# Patient Record
Sex: FEMALE | Race: WHITE | NOT HISPANIC OR LATINO | Employment: PART TIME | URBAN - NONMETROPOLITAN AREA
[De-identification: names, ages, dates, MRNs, and addresses within clinical notes are randomized per-mention and may not be internally consistent; named-entity substitution may affect disease eponyms.]

---

## 2017-04-07 ENCOUNTER — APPOINTMENT (OUTPATIENT)
Dept: LAB | Facility: HOSPITAL | Age: 22
End: 2017-04-07

## 2017-04-07 ENCOUNTER — TRANSCRIBE ORDERS (OUTPATIENT)
Dept: ADMINISTRATIVE | Facility: HOSPITAL | Age: 22
End: 2017-04-07

## 2017-04-07 DIAGNOSIS — R60.9 EDEMA, UNSPECIFIED TYPE: Primary | ICD-10-CM

## 2017-04-07 LAB
ALBUMIN SERPL-MCNC: 4.4 G/DL (ref 3.5–5)
ALBUMIN/GLOB SERPL: 1.2 G/DL (ref 1–2)
ALP SERPL-CCNC: 95 U/L (ref 38–126)
ALT SERPL W P-5'-P-CCNC: 35 U/L (ref 13–69)
ANION GAP SERPL CALCULATED.3IONS-SCNC: 11.8 MMOL/L
AST SERPL-CCNC: 21 U/L (ref 15–46)
BACTERIA UR QL AUTO: ABNORMAL /HPF
BASOPHILS # BLD AUTO: 0.03 10*3/MM3 (ref 0–0.2)
BASOPHILS NFR BLD AUTO: 0.4 % (ref 0–2.5)
BILIRUB SERPL-MCNC: 0.6 MG/DL (ref 0.2–1.3)
BILIRUB UR QL STRIP: NEGATIVE
BUN BLD-MCNC: 9 MG/DL (ref 7–20)
BUN/CREAT SERPL: 12.9 (ref 7.1–23.5)
CALCIUM SPEC-SCNC: 9 MG/DL (ref 8.4–10.2)
CHLORIDE SERPL-SCNC: 105 MMOL/L (ref 98–107)
CHOLEST SERPL-MCNC: 183 MG/DL (ref 0–199)
CLARITY UR: CLEAR
CO2 SERPL-SCNC: 27 MMOL/L (ref 26–30)
COLOR UR: YELLOW
CREAT BLD-MCNC: 0.7 MG/DL (ref 0.6–1.3)
DEPRECATED RDW RBC AUTO: 36.8 FL (ref 37–54)
EOSINOPHIL # BLD AUTO: 0.07 10*3/MM3 (ref 0–0.7)
EOSINOPHIL NFR BLD AUTO: 1 % (ref 0–7)
ERYTHROCYTE [DISTWIDTH] IN BLOOD BY AUTOMATED COUNT: 12.4 % (ref 11.5–14.5)
GFR SERPL CREATININE-BSD FRML MDRD: 105 ML/MIN/1.73
GLOBULIN UR ELPH-MCNC: 3.6 GM/DL
GLUCOSE BLD-MCNC: 84 MG/DL (ref 74–98)
GLUCOSE UR STRIP-MCNC: NEGATIVE MG/DL
HCT VFR BLD AUTO: 40.4 % (ref 37–47)
HDLC SERPL-MCNC: 46 MG/DL (ref 40–60)
HGB BLD-MCNC: 13.8 G/DL (ref 12–16)
HGB UR QL STRIP.AUTO: NEGATIVE
HYALINE CASTS UR QL AUTO: ABNORMAL /LPF
IMM GRANULOCYTES # BLD: 0.02 10*3/MM3 (ref 0–0.06)
IMM GRANULOCYTES NFR BLD: 0.3 % (ref 0–0.6)
KETONES UR QL STRIP: NEGATIVE
LDLC SERPL CALC-MCNC: 118 MG/DL (ref 0–99)
LDLC/HDLC SERPL: 2.56 {RATIO}
LEUKOCYTE ESTERASE UR QL STRIP.AUTO: ABNORMAL
LYMPHOCYTES # BLD AUTO: 2.13 10*3/MM3 (ref 0.6–3.4)
LYMPHOCYTES NFR BLD AUTO: 30.3 % (ref 10–50)
MCH RBC QN AUTO: 28.2 PG (ref 27–31)
MCHC RBC AUTO-ENTMCNC: 34.2 G/DL (ref 30–37)
MCV RBC AUTO: 82.4 FL (ref 81–99)
MONOCYTES # BLD AUTO: 0.51 10*3/MM3 (ref 0–0.9)
MONOCYTES NFR BLD AUTO: 7.2 % (ref 0–12)
NEUTROPHILS # BLD AUTO: 4.28 10*3/MM3 (ref 2–6.9)
NEUTROPHILS NFR BLD AUTO: 60.8 % (ref 37–80)
NITRITE UR QL STRIP: NEGATIVE
NRBC BLD MANUAL-RTO: 0 /100 WBC (ref 0–0)
NT-PROBNP SERPL-MCNC: 56.9 PG/ML (ref 0–125)
PH UR STRIP.AUTO: 7.5 [PH] (ref 5–8)
PLATELET # BLD AUTO: 241 10*3/MM3 (ref 130–400)
PMV BLD AUTO: 10.4 FL (ref 6–12)
POTASSIUM BLD-SCNC: 3.8 MMOL/L (ref 3.5–5.1)
PROT SERPL-MCNC: 8 G/DL (ref 6.3–8.2)
PROT UR QL STRIP: NEGATIVE
RBC # BLD AUTO: 4.9 10*6/MM3 (ref 4.2–5.4)
RBC # UR: ABNORMAL /HPF
REF LAB TEST METHOD: ABNORMAL
SODIUM BLD-SCNC: 140 MMOL/L (ref 137–145)
SP GR UR STRIP: 1.02 (ref 1–1.03)
SQUAMOUS #/AREA URNS HPF: ABNORMAL /HPF
TRIGL SERPL-MCNC: 97 MG/DL
TSH SERPL DL<=0.05 MIU/L-ACNC: 1.75 MIU/ML (ref 0.47–4.68)
UROBILINOGEN UR QL STRIP: ABNORMAL
VLDLC SERPL-MCNC: 19.4 MG/DL
WBC NRBC COR # BLD: 7.04 10*3/MM3 (ref 4.8–10.8)
WBC UR QL AUTO: ABNORMAL /HPF

## 2017-04-07 PROCEDURE — 85025 COMPLETE CBC W/AUTO DIFF WBC: CPT | Performed by: FAMILY MEDICINE

## 2017-04-07 PROCEDURE — 84443 ASSAY THYROID STIM HORMONE: CPT | Performed by: FAMILY MEDICINE

## 2017-04-07 PROCEDURE — 80061 LIPID PANEL: CPT | Performed by: FAMILY MEDICINE

## 2017-04-07 PROCEDURE — 82570 ASSAY OF URINE CREATININE: CPT | Performed by: FAMILY MEDICINE

## 2017-04-07 PROCEDURE — 81001 URINALYSIS AUTO W/SCOPE: CPT | Performed by: FAMILY MEDICINE

## 2017-04-07 PROCEDURE — 82043 UR ALBUMIN QUANTITATIVE: CPT | Performed by: FAMILY MEDICINE

## 2017-04-07 PROCEDURE — 83880 ASSAY OF NATRIURETIC PEPTIDE: CPT | Performed by: FAMILY MEDICINE

## 2017-04-07 PROCEDURE — 36415 COLL VENOUS BLD VENIPUNCTURE: CPT | Performed by: FAMILY MEDICINE

## 2017-04-07 PROCEDURE — 80053 COMPREHEN METABOLIC PANEL: CPT | Performed by: FAMILY MEDICINE

## 2017-04-08 LAB
CREAT 24H UR-MCNC: 80 MG/DL
MICROALB/CRT. RATIO UR: 14.9 MG/G CREAT (ref 0–30)
MICROALBUMIN UR-MCNC: 11.9 UG/ML

## 2017-04-11 ENCOUNTER — HOSPITAL ENCOUNTER (EMERGENCY)
Facility: HOSPITAL | Age: 22
Discharge: HOME | End: 2017-04-12
Attending: EMERGENCY MEDICINE | Admitting: EMERGENCY MEDICINE

## 2017-04-11 DIAGNOSIS — I95.1 SYMPATHOTONIC ORTHOSTATIC HYPOTENSION: Primary | ICD-10-CM

## 2017-04-11 LAB
ALBUMIN SERPL-MCNC: 4.6 G/DL (ref 3.5–5)
ALBUMIN/GLOB SERPL: 1.2 G/DL (ref 1–2)
ALP SERPL-CCNC: 85 U/L (ref 38–126)
ALT SERPL W P-5'-P-CCNC: 36 U/L (ref 13–69)
ANION GAP SERPL CALCULATED.3IONS-SCNC: 18.7 MMOL/L
AST SERPL-CCNC: 58 U/L (ref 15–46)
BASOPHILS # BLD AUTO: 0.04 10*3/MM3 (ref 0–0.2)
BASOPHILS NFR BLD AUTO: 0.5 % (ref 0–2.5)
BILIRUB SERPL-MCNC: 0.8 MG/DL (ref 0.2–1.3)
BUN BLD-MCNC: 16 MG/DL (ref 7–20)
BUN/CREAT SERPL: 12.3 (ref 7.1–23.5)
CALCIUM SPEC-SCNC: 9.4 MG/DL (ref 8.4–10.2)
CHLORIDE SERPL-SCNC: 101 MMOL/L (ref 98–107)
CO2 SERPL-SCNC: 27 MMOL/L (ref 26–30)
CREAT BLD-MCNC: 1.3 MG/DL (ref 0.6–1.3)
DEPRECATED RDW RBC AUTO: 37.3 FL (ref 37–54)
EOSINOPHIL # BLD AUTO: 0.1 10*3/MM3 (ref 0–0.7)
EOSINOPHIL NFR BLD AUTO: 1.2 % (ref 0–7)
ERYTHROCYTE [DISTWIDTH] IN BLOOD BY AUTOMATED COUNT: 12.6 % (ref 11.5–14.5)
GFR SERPL CREATININE-BSD FRML MDRD: 51 ML/MIN/1.73
GLOBULIN UR ELPH-MCNC: 4 GM/DL
GLUCOSE BLD-MCNC: 126 MG/DL (ref 74–98)
HCT VFR BLD AUTO: 42.4 % (ref 37–47)
HGB BLD-MCNC: 14.5 G/DL (ref 12–16)
IMM GRANULOCYTES # BLD: 0.03 10*3/MM3 (ref 0–0.06)
IMM GRANULOCYTES NFR BLD: 0.3 % (ref 0–0.6)
LYMPHOCYTES # BLD AUTO: 2.77 10*3/MM3 (ref 0.6–3.4)
LYMPHOCYTES NFR BLD AUTO: 32.1 % (ref 10–50)
MCH RBC QN AUTO: 28 PG (ref 27–31)
MCHC RBC AUTO-ENTMCNC: 34.2 G/DL (ref 30–37)
MCV RBC AUTO: 82 FL (ref 81–99)
MONOCYTES # BLD AUTO: 0.56 10*3/MM3 (ref 0–0.9)
MONOCYTES NFR BLD AUTO: 6.5 % (ref 0–12)
NEUTROPHILS # BLD AUTO: 5.13 10*3/MM3 (ref 2–6.9)
NEUTROPHILS NFR BLD AUTO: 59.4 % (ref 37–80)
NRBC BLD MANUAL-RTO: 0 /100 WBC (ref 0–0)
PLATELET # BLD AUTO: 291 10*3/MM3 (ref 130–400)
PMV BLD AUTO: 9.9 FL (ref 6–12)
POTASSIUM BLD-SCNC: 3.7 MMOL/L (ref 3.5–5.1)
PROT SERPL-MCNC: 8.6 G/DL (ref 6.3–8.2)
RBC # BLD AUTO: 5.17 10*6/MM3 (ref 4.2–5.4)
SODIUM BLD-SCNC: 143 MMOL/L (ref 137–145)
WBC NRBC COR # BLD: 8.63 10*3/MM3 (ref 4.8–10.8)

## 2017-04-11 PROCEDURE — 85025 COMPLETE CBC W/AUTO DIFF WBC: CPT | Performed by: EMERGENCY MEDICINE

## 2017-04-11 PROCEDURE — 84703 CHORIONIC GONADOTROPIN ASSAY: CPT | Performed by: EMERGENCY MEDICINE

## 2017-04-11 PROCEDURE — 93005 ELECTROCARDIOGRAM TRACING: CPT | Performed by: EMERGENCY MEDICINE

## 2017-04-11 PROCEDURE — 80053 COMPREHEN METABOLIC PANEL: CPT | Performed by: EMERGENCY MEDICINE

## 2017-04-11 PROCEDURE — 99283 EMERGENCY DEPT VISIT LOW MDM: CPT

## 2017-04-11 PROCEDURE — 96360 HYDRATION IV INFUSION INIT: CPT

## 2017-04-11 RX ORDER — SODIUM CHLORIDE 0.9 % (FLUSH) 0.9 %
10 SYRINGE (ML) INJECTION AS NEEDED
Status: DISCONTINUED | OUTPATIENT
Start: 2017-04-11 | End: 2017-04-12 | Stop reason: HOSPADM

## 2017-04-11 RX ORDER — TRIAMTERENE AND HYDROCHLOROTHIAZIDE 37.5; 25 MG/1; MG/1
1 TABLET ORAL DAILY
COMMUNITY

## 2017-04-11 RX ORDER — LISINOPRIL 10 MG/1
10 TABLET ORAL DAILY
COMMUNITY
End: 2018-08-31

## 2017-04-11 RX ADMIN — SODIUM CHLORIDE 500 ML: 9 INJECTION, SOLUTION INTRAVENOUS at 23:51

## 2017-04-12 VITALS
SYSTOLIC BLOOD PRESSURE: 123 MMHG | HEART RATE: 70 BPM | WEIGHT: 291 LBS | RESPIRATION RATE: 18 BRPM | DIASTOLIC BLOOD PRESSURE: 71 MMHG | HEIGHT: 64 IN | OXYGEN SATURATION: 98 % | TEMPERATURE: 97.6 F | BODY MASS INDEX: 49.68 KG/M2

## 2017-04-12 LAB
HCG SERPL QL: NEGATIVE
HOLD SPECIMEN: NORMAL
HOLD SPECIMEN: NORMAL
WHOLE BLOOD HOLD SPECIMEN: NORMAL
WHOLE BLOOD HOLD SPECIMEN: NORMAL

## 2017-04-12 NOTE — ED PROVIDER NOTES
Subjective   History of Present Illness   21F w/ hx of anxiety, htn, asthma p/w orthostatic lightheadedness, sob and palpitations after being started on hydrochlorothiazide, triamterene, and lisinopril earlier today.  States she had some elevated blood pressure in clinic and so they started her on these 3 medications.  It was her first dose of both the lisinopril and the triamterene today.  She denies any other acute changes.  States that her symptoms are worse when she sits up or stands up, and she feels slightly unsteady on her feet.  No other complaints.    Review of Systems   Respiratory: Positive for shortness of breath.    Neurological: Positive for light-headedness.   All other systems reviewed and are negative.      Past Medical History:   Diagnosis Date   • Anxiety    • Asthma    • Hypertension    • Sleep apnea        No Known Allergies    Past Surgical History:   Procedure Laterality Date   • ADENOIDECTOMY     • KIDNEY STONE SURGERY     • TONSILLECTOMY         History reviewed. No pertinent family history.    Social History     Social History   • Marital status:      Spouse name: N/A   • Number of children: N/A   • Years of education: N/A     Social History Main Topics   • Smoking status: Never Smoker   • Smokeless tobacco: None   • Alcohol use None      Comment: social   • Drug use: None   • Sexual activity: Not Asked     Other Topics Concern   • None     Social History Narrative   • None           Objective   Physical Exam   Constitutional: She is oriented to person, place, and time. She appears well-developed and well-nourished. No distress.   obese   HENT:   Head: Normocephalic.   Mouth/Throat: Oropharynx is clear and moist. No oropharyngeal exudate.   Eyes: Conjunctivae are normal. No scleral icterus.   Neck: Neck supple. No tracheal deviation present.   Cardiovascular: Normal rate, regular rhythm, normal heart sounds and intact distal pulses.  Exam reveals no gallop and no friction rub.    No  murmur heard.  Pulmonary/Chest: Effort normal and breath sounds normal. No stridor. No respiratory distress. She has no wheezes. She has no rales. She exhibits no tenderness.   Abdominal: Soft. She exhibits no distension and no mass. There is no tenderness. There is no rebound and no guarding. No hernia.   Musculoskeletal: She exhibits no edema or deformity.   Neurological: She is alert and oriented to person, place, and time.   Strength and sensation intact to BUE / BLE   Skin: Skin is warm and dry. She is not diaphoretic. No erythema. No pallor.   Psychiatric: She has a normal mood and affect. Her behavior is normal.   Nursing note and vitals reviewed.      Procedures         ED Course  ED Course                  MDM   21F here w/ palpitations, lightheadedness after starting antihypertensives tonight.  Overall, vital signs stable.  Lungs are clear.  Neurovascularly intact.  Patient is symptomatic when she sits up.  Suspect she is and used to having a normalized blood pressure and likely slightly orthostatic from all medications.  We'll give a small fluids, check CBC, BMP, EKG, chest x-ray and anticipate discharge home.    EKG: NSR w/ nl intervals, no yimi/std.     12:31 AM Labs overall unremarkable. Pt feeling improved w/ PO and IV fluids. Discussed holding lisinopril and following up w/ pcp tomorrow. Discussed strict return to care precautions.     Final diagnoses:   Sympathotonic orthostatic hypotension            Raoul Mills MD  04/12/17 0032

## 2017-04-12 NOTE — DISCHARGE INSTRUCTIONS
Please call and follow up with your primary care physician in the next 1-2 days for re-evaluation.   Please stop taking your lisinopril until you follow up with your primary care physician.

## 2018-08-31 ENCOUNTER — APPOINTMENT (OUTPATIENT)
Dept: CT IMAGING | Facility: HOSPITAL | Age: 23
End: 2018-08-31

## 2018-08-31 ENCOUNTER — HOSPITAL ENCOUNTER (EMERGENCY)
Facility: HOSPITAL | Age: 23
Discharge: HOME | End: 2018-08-31
Attending: EMERGENCY MEDICINE | Admitting: EMERGENCY MEDICINE

## 2018-08-31 VITALS
SYSTOLIC BLOOD PRESSURE: 140 MMHG | RESPIRATION RATE: 18 BRPM | BODY MASS INDEX: 48.65 KG/M2 | WEIGHT: 285 LBS | DIASTOLIC BLOOD PRESSURE: 86 MMHG | HEART RATE: 77 BPM | HEIGHT: 64 IN | TEMPERATURE: 97.7 F | OXYGEN SATURATION: 98 %

## 2018-08-31 DIAGNOSIS — R06.00 ACUTE DYSPNEA: Primary | ICD-10-CM

## 2018-08-31 DIAGNOSIS — R07.9 NONSPECIFIC CHEST PAIN: ICD-10-CM

## 2018-08-31 LAB
ANION GAP SERPL CALCULATED.3IONS-SCNC: 5 MMOL/L (ref 3–11)
BASOPHILS # BLD AUTO: 0.03 10*3/MM3 (ref 0–0.2)
BASOPHILS NFR BLD AUTO: 0.3 % (ref 0–1)
BNP SERPL-MCNC: 5 PG/ML (ref 0–100)
BUN BLD-MCNC: 16 MG/DL (ref 9–23)
BUN/CREAT SERPL: 21.3 (ref 7–25)
CALCIUM SPEC-SCNC: 9.2 MG/DL (ref 8.7–10.4)
CHLORIDE SERPL-SCNC: 102 MMOL/L (ref 99–109)
CO2 SERPL-SCNC: 29 MMOL/L (ref 20–31)
CREAT BLD-MCNC: 0.75 MG/DL (ref 0.6–1.3)
DEPRECATED RDW RBC AUTO: 36.4 FL (ref 37–54)
EOSINOPHIL # BLD AUTO: 0.05 10*3/MM3 (ref 0–0.3)
EOSINOPHIL NFR BLD AUTO: 0.6 % (ref 0–3)
ERYTHROCYTE [DISTWIDTH] IN BLOOD BY AUTOMATED COUNT: 11.7 % (ref 11.3–14.5)
GFR SERPL CREATININE-BSD FRML MDRD: 96 ML/MIN/1.73
GLUCOSE BLD-MCNC: 79 MG/DL (ref 70–100)
HCT VFR BLD AUTO: 39.1 % (ref 34.5–44)
HGB BLD-MCNC: 13.9 G/DL (ref 11.5–15.5)
IMM GRANULOCYTES # BLD: 0.02 10*3/MM3 (ref 0–0.03)
IMM GRANULOCYTES NFR BLD: 0.2 % (ref 0–0.6)
LYMPHOCYTES # BLD AUTO: 3.14 10*3/MM3 (ref 0.6–4.8)
LYMPHOCYTES NFR BLD AUTO: 34.6 % (ref 24–44)
MCH RBC QN AUTO: 30.7 PG (ref 27–31)
MCHC RBC AUTO-ENTMCNC: 35.5 G/DL (ref 32–36)
MCV RBC AUTO: 86.3 FL (ref 80–99)
MONOCYTES # BLD AUTO: 0.62 10*3/MM3 (ref 0–1)
MONOCYTES NFR BLD AUTO: 6.8 % (ref 0–12)
NEUTROPHILS # BLD AUTO: 5.23 10*3/MM3 (ref 1.5–8.3)
NEUTROPHILS NFR BLD AUTO: 57.7 % (ref 41–71)
PLATELET # BLD AUTO: 216 10*3/MM3 (ref 150–450)
PMV BLD AUTO: 9.8 FL (ref 6–12)
POTASSIUM BLD-SCNC: 3.2 MMOL/L (ref 3.5–5.5)
RBC # BLD AUTO: 4.53 10*6/MM3 (ref 3.89–5.14)
SODIUM BLD-SCNC: 136 MMOL/L (ref 132–146)
WBC NRBC COR # BLD: 9.07 10*3/MM3 (ref 3.5–10.8)

## 2018-08-31 PROCEDURE — 99284 EMERGENCY DEPT VISIT MOD MDM: CPT

## 2018-08-31 PROCEDURE — 25010000002 LORAZEPAM PER 2 MG: Performed by: EMERGENCY MEDICINE

## 2018-08-31 PROCEDURE — 71275 CT ANGIOGRAPHY CHEST: CPT

## 2018-08-31 PROCEDURE — 0 IOPAMIDOL PER 1 ML: Performed by: EMERGENCY MEDICINE

## 2018-08-31 PROCEDURE — 83880 ASSAY OF NATRIURETIC PEPTIDE: CPT | Performed by: EMERGENCY MEDICINE

## 2018-08-31 PROCEDURE — 93005 ELECTROCARDIOGRAM TRACING: CPT | Performed by: EMERGENCY MEDICINE

## 2018-08-31 PROCEDURE — 80048 BASIC METABOLIC PNL TOTAL CA: CPT | Performed by: EMERGENCY MEDICINE

## 2018-08-31 PROCEDURE — 96374 THER/PROPH/DIAG INJ IV PUSH: CPT

## 2018-08-31 PROCEDURE — 85025 COMPLETE CBC W/AUTO DIFF WBC: CPT | Performed by: EMERGENCY MEDICINE

## 2018-08-31 RX ORDER — LORAZEPAM 2 MG/ML
0.5 INJECTION INTRAMUSCULAR ONCE
Status: COMPLETED | OUTPATIENT
Start: 2018-08-31 | End: 2018-08-31

## 2018-08-31 RX ADMIN — LORAZEPAM 0.5 MG: 2 INJECTION INTRAMUSCULAR; INTRAVENOUS at 20:41

## 2018-08-31 RX ADMIN — IOPAMIDOL 64 ML: 755 INJECTION, SOLUTION INTRAVENOUS at 20:01

## 2018-09-01 NOTE — DISCHARGE INSTRUCTIONS
Avoid all stimulants to include caffeine and decongestants.    Push fluids.    Fix your CPAP machine as soon as possible.    Return to the emergency Department immediately if worse.

## 2018-10-19 ENCOUNTER — HOSPITAL ENCOUNTER (EMERGENCY)
Facility: HOSPITAL | Age: 23
Discharge: HOME | End: 2018-10-19
Attending: EMERGENCY MEDICINE | Admitting: EMERGENCY MEDICINE

## 2018-10-19 ENCOUNTER — APPOINTMENT (OUTPATIENT)
Dept: GENERAL RADIOLOGY | Facility: HOSPITAL | Age: 23
End: 2018-10-19

## 2018-10-19 VITALS
OXYGEN SATURATION: 92 % | RESPIRATION RATE: 18 BRPM | TEMPERATURE: 98.2 F | HEIGHT: 64 IN | DIASTOLIC BLOOD PRESSURE: 83 MMHG | BODY MASS INDEX: 47.8 KG/M2 | WEIGHT: 280 LBS | SYSTOLIC BLOOD PRESSURE: 126 MMHG | HEART RATE: 69 BPM

## 2018-10-19 DIAGNOSIS — S46.919A STRAIN OF SHOULDER, UNSPECIFIED LATERALITY, INITIAL ENCOUNTER: ICD-10-CM

## 2018-10-19 DIAGNOSIS — M62.830 BACK SPASM: ICD-10-CM
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
## 2018-10-19 DIAGNOSIS — S16.1XXA STRAIN OF NECK MUSCLE, INITIAL ENCOUNTER: ICD-10-CM

## 2018-10-19 LAB
B-HCG UR QL: NEGATIVE
INTERNAL NEGATIVE CONTROL: NEGATIVE
INTERNAL POSITIVE CONTROL: POSITIVE
Lab: NORMAL

## 2018-10-19 PROCEDURE — 81025 URINE PREGNANCY TEST: CPT | Performed by: EMERGENCY MEDICINE

## 2018-10-19 PROCEDURE — 99283 EMERGENCY DEPT VISIT LOW MDM: CPT

## 2018-10-19 PROCEDURE — 72040 X-RAY EXAM NECK SPINE 2-3 VW: CPT

## 2018-10-19 PROCEDURE — 73030 X-RAY EXAM OF SHOULDER: CPT

## 2018-10-19 RX ORDER — CYCLOBENZAPRINE HCL 10 MG
10 TABLET ORAL ONCE
Status: COMPLETED | OUTPATIENT
Start: 2018-10-19 | End: 2018-10-19

## 2018-10-19 RX ORDER — PREDNISONE 20 MG/1
40 TABLET ORAL DAILY
Qty: 8 TABLET | Refills: 0 | Status: SHIPPED | OUTPATIENT
Start: 2018-10-19

## 2018-10-19 RX ORDER — HYDROCODONE BITARTRATE AND ACETAMINOPHEN 10; 325 MG/1; MG/1
1 TABLET ORAL ONCE
Status: COMPLETED | OUTPATIENT
Start: 2018-10-19 | End: 2018-10-19

## 2018-10-19 RX ORDER — CYCLOBENZAPRINE HCL 10 MG
10 TABLET ORAL 3 TIMES DAILY PRN
Qty: 15 TABLET | Refills: 0 | Status: SHIPPED | OUTPATIENT
Start: 2018-10-19

## 2018-10-19 RX ADMIN — CYCLOBENZAPRINE HYDROCHLORIDE 10 MG: 10 TABLET, FILM COATED ORAL at 14:53

## 2018-10-19 RX ADMIN — HYDROCODONE BITARTRATE AND ACETAMINOPHEN 1 TABLET: 10; 325 TABLET ORAL at 14:53
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
## 2018-10-31 ENCOUNTER — TREATMENT (OUTPATIENT)
Dept: PHYSICAL THERAPY | Facility: CLINIC | Age: 23
End: 2018-10-31

## 2018-10-31 DIAGNOSIS — S13.4XXA WHIPLASH INJURY TO NECK, INITIAL ENCOUNTER: ICD-10-CM

## 2018-10-31 DIAGNOSIS — M54.2 CERVICAL PAIN: ICD-10-CM

## 2018-10-31 DIAGNOSIS — M54.50 BILATERAL LOW BACK PAIN WITHOUT SCIATICA, UNSPECIFIED CHRONICITY: Primary | ICD-10-CM

## 2018-10-31 DIAGNOSIS — S16.1XXA STRAIN OF NECK MUSCLE, INITIAL ENCOUNTER: ICD-10-CM

## 2018-10-31 PROCEDURE — 97014 ELECTRIC STIMULATION THERAPY: CPT | Performed by: PHYSICAL THERAPIST

## 2018-10-31 PROCEDURE — 97162 PT EVAL MOD COMPLEX 30 MIN: CPT | Performed by: PHYSICAL THERAPIST

## 2018-10-31 PROCEDURE — 97110 THERAPEUTIC EXERCISES: CPT | Performed by: PHYSICAL THERAPIST

## 2018-10-31 PROCEDURE — A4595 TENS SUPPL 2 LEAD PER MONTH: HCPCS | Performed by: PHYSICAL THERAPIST

## 2018-10-31 PROCEDURE — A9999 DME SUPPLY OR ACCESSORY, NOS: HCPCS | Performed by: PHYSICAL THERAPIST
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
## 2018-11-02 ENCOUNTER — TRANSCRIBE ORDERS (OUTPATIENT)
Dept: PHYSICAL THERAPY | Facility: CLINIC | Age: 23
End: 2018-11-02

## 2018-11-02 DIAGNOSIS — M25.519 ACUTE SHOULDER PAIN, UNSPECIFIED LATERALITY: ICD-10-CM

## 2018-11-02 DIAGNOSIS — M54.5 ACUTE LOW BACK PAIN, UNSPECIFIED BACK PAIN LATERALITY, WITH SCIATICA PRESENCE UNSPECIFIED: ICD-10-CM

## 2018-11-02 DIAGNOSIS — M54.2 PAIN, NECK: Primary | ICD-10-CM

## 2018-12-10 ENCOUNTER — TREATMENT (OUTPATIENT)
Dept: PHYSICAL THERAPY | Facility: CLINIC | Age: 23
End: 2018-12-10

## 2018-12-10 DIAGNOSIS — M54.50 BILATERAL LOW BACK PAIN WITHOUT SCIATICA, UNSPECIFIED CHRONICITY: Primary | ICD-10-CM

## 2018-12-10 DIAGNOSIS — S13.4XXA WHIPLASH INJURY TO NECK, INITIAL ENCOUNTER: ICD-10-CM

## 2018-12-10 DIAGNOSIS — S16.1XXA STRAIN OF NECK MUSCLE, INITIAL ENCOUNTER: ICD-10-CM

## 2018-12-10 DIAGNOSIS — M54.2 CERVICAL PAIN: ICD-10-CM

## 2018-12-10 PROCEDURE — 97162 PT EVAL MOD COMPLEX 30 MIN: CPT | Performed by: PHYSICAL THERAPIST

## 2018-12-10 PROCEDURE — 97014 ELECTRIC STIMULATION THERAPY: CPT | Performed by: PHYSICAL THERAPIST
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
## 2018-12-12 ENCOUNTER — TREATMENT (OUTPATIENT)
Dept: PHYSICAL THERAPY | Facility: CLINIC | Age: 23
End: 2018-12-12

## 2018-12-12 DIAGNOSIS — M54.2 CERVICAL PAIN: ICD-10-CM

## 2018-12-12 DIAGNOSIS — S13.4XXA WHIPLASH INJURY TO NECK, INITIAL ENCOUNTER: ICD-10-CM

## 2018-12-12 DIAGNOSIS — M54.50 BILATERAL LOW BACK PAIN WITHOUT SCIATICA, UNSPECIFIED CHRONICITY: Primary | ICD-10-CM

## 2018-12-12 DIAGNOSIS — S16.1XXA STRAIN OF NECK MUSCLE, INITIAL ENCOUNTER: ICD-10-CM

## 2018-12-12 PROCEDURE — 97110 THERAPEUTIC EXERCISES: CPT | Performed by: PHYSICAL THERAPIST

## 2018-12-12 PROCEDURE — 97140 MANUAL THERAPY 1/> REGIONS: CPT | Performed by: PHYSICAL THERAPIST

## 2018-12-12 PROCEDURE — 97014 ELECTRIC STIMULATION THERAPY: CPT | Performed by: PHYSICAL THERAPIST

## 2018-12-14 ENCOUNTER — TREATMENT (OUTPATIENT)
Dept: PHYSICAL THERAPY | Facility: CLINIC | Age: 23
End: 2018-12-14

## 2018-12-14 DIAGNOSIS — S13.4XXA WHIPLASH INJURY TO NECK, INITIAL ENCOUNTER: ICD-10-CM

## 2018-12-14 DIAGNOSIS — S16.1XXA STRAIN OF NECK MUSCLE, INITIAL ENCOUNTER: ICD-10-CM

## 2018-12-14 DIAGNOSIS — M54.2 CERVICAL PAIN: ICD-10-CM

## 2018-12-14 DIAGNOSIS — M54.50 BILATERAL LOW BACK PAIN WITHOUT SCIATICA, UNSPECIFIED CHRONICITY: Primary | ICD-10-CM

## 2018-12-14 PROCEDURE — 97110 THERAPEUTIC EXERCISES: CPT | Performed by: PHYSICAL THERAPIST

## 2018-12-14 PROCEDURE — 97014 ELECTRIC STIMULATION THERAPY: CPT | Performed by: PHYSICAL THERAPIST
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
## 2018-12-17 ENCOUNTER — TREATMENT (OUTPATIENT)
Dept: PHYSICAL THERAPY | Facility: CLINIC | Age: 23
End: 2018-12-17

## 2018-12-17 DIAGNOSIS — M54.50 BILATERAL LOW BACK PAIN WITHOUT SCIATICA, UNSPECIFIED CHRONICITY: Primary | ICD-10-CM

## 2018-12-17 DIAGNOSIS — S13.4XXA WHIPLASH INJURY TO NECK, INITIAL ENCOUNTER: ICD-10-CM

## 2018-12-17 DIAGNOSIS — M54.2 CERVICAL PAIN: ICD-10-CM

## 2018-12-17 DIAGNOSIS — S16.1XXA STRAIN OF NECK MUSCLE, INITIAL ENCOUNTER: ICD-10-CM

## 2018-12-17 PROCEDURE — 97140 MANUAL THERAPY 1/> REGIONS: CPT | Performed by: PHYSICAL THERAPIST

## 2018-12-17 PROCEDURE — 97110 THERAPEUTIC EXERCISES: CPT | Performed by: PHYSICAL THERAPIST
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
## 2018-12-20 ENCOUNTER — TREATMENT (OUTPATIENT)
Dept: PHYSICAL THERAPY | Facility: CLINIC | Age: 23
End: 2018-12-20

## 2018-12-20 DIAGNOSIS — M54.2 CERVICAL PAIN: ICD-10-CM

## 2018-12-20 DIAGNOSIS — S16.1XXS STRAIN OF NECK MUSCLE, SEQUELA: ICD-10-CM

## 2018-12-20 DIAGNOSIS — M54.50 BILATERAL LOW BACK PAIN WITHOUT SCIATICA, UNSPECIFIED CHRONICITY: Primary | ICD-10-CM

## 2018-12-20 DIAGNOSIS — S13.4XXS WHIPLASH INJURY TO NECK, SEQUELA: ICD-10-CM

## 2018-12-20 PROCEDURE — 97140 MANUAL THERAPY 1/> REGIONS: CPT | Performed by: PHYSICAL THERAPIST

## 2018-12-20 PROCEDURE — 97110 THERAPEUTIC EXERCISES: CPT | Performed by: PHYSICAL THERAPIST
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
## 2021-06-16 ENCOUNTER — HOSPITAL ENCOUNTER (INPATIENT)
Dept: HOSPITAL 79 - GENOP | Age: 26
LOS: 4 days | Discharge: HOME | End: 2021-06-20
Attending: OBSTETRICS & GYNECOLOGY | Admitting: OBSTETRICS & GYNECOLOGY
Payer: COMMERCIAL

## 2021-06-16 VITALS — HEIGHT: 63 IN | WEIGHT: 293 LBS | BODY MASS INDEX: 51.91 KG/M2

## 2021-06-16 DIAGNOSIS — Z3A.38: ICD-10-CM

## 2021-06-16 DIAGNOSIS — E66.9: ICD-10-CM

## 2021-06-16 LAB
HGB BLD-MCNC: 13.7 GM/DL (ref 12.3–15.3)
RED BLOOD COUNT: 4.12 M/UL (ref 4–5.1)
WHITE BLOOD COUNT: 9.5 K/UL (ref 4.5–11)

## 2021-06-16 PROCEDURE — U0003 INFECTIOUS AGENT DETECTION BY NUCLEIC ACID (DNA OR RNA); SEVERE ACUTE RESPIRATORY SYNDROME CORONAVIRUS 2 (SARS-COV-2) (CORONAVIRUS DISEASE [COVID-19]), AMPLIFIED PROBE TECHNIQUE, MAKING USE OF HIGH THROUGHPUT TECHNOLOGIES AS DESCRIBED BY CMS-2020-01-R: HCPCS

## 2021-06-16 PROCEDURE — C9113 INJ PANTOPRAZOLE SODIUM, VIA: HCPCS

## 2021-06-16 PROCEDURE — C1894 INTRO/SHEATH, NON-LASER: HCPCS

## 2021-06-16 PROCEDURE — 10907ZC DRAINAGE OF AMNIOTIC FLUID, THERAPEUTIC FROM PRODUCTS OF CONCEPTION, VIA NATURAL OR ARTIFICIAL OPENING: ICD-10-PCS | Performed by: OBSTETRICS & GYNECOLOGY

## 2021-06-18 LAB — HGB BLD-MCNC: 11.6 GM/DL (ref 12.3–15.3)

## 2023-09-14 ENCOUNTER — TELEPHONE (OUTPATIENT)
Dept: CARDIOLOGY | Facility: CLINIC | Age: 28
End: 2023-09-14
Payer: COMMERCIAL

## 2024-12-23 ENCOUNTER — OFFICE VISIT (OUTPATIENT)
Dept: UROLOGY | Facility: CLINIC | Age: 29
End: 2024-12-23
Payer: COMMERCIAL

## 2024-12-23 VITALS
SYSTOLIC BLOOD PRESSURE: 120 MMHG | HEIGHT: 63 IN | BODY MASS INDEX: 51.91 KG/M2 | TEMPERATURE: 98 F | HEART RATE: 81 BPM | OXYGEN SATURATION: 98 % | DIASTOLIC BLOOD PRESSURE: 80 MMHG | WEIGHT: 293 LBS

## 2024-12-23 DIAGNOSIS — E27.9 LESION OF ADRENAL GLAND: Primary | ICD-10-CM

## 2024-12-23 PROCEDURE — 99204 OFFICE O/P NEW MOD 45 MIN: CPT | Performed by: UROLOGY

## 2024-12-23 PROCEDURE — 1160F RVW MEDS BY RX/DR IN RCRD: CPT | Performed by: UROLOGY

## 2024-12-23 PROCEDURE — 1159F MED LIST DOCD IN RCRD: CPT | Performed by: UROLOGY

## 2024-12-23 RX ORDER — POTASSIUM CHLORIDE 750 MG/1
1 CAPSULE, EXTENDED RELEASE ORAL DAILY
COMMUNITY
Start: 2024-09-17

## 2024-12-23 RX ORDER — LOSARTAN POTASSIUM 25 MG/1
25 TABLET ORAL DAILY
COMMUNITY

## 2024-12-23 RX ORDER — MELOXICAM 15 MG/1
1 TABLET ORAL DAILY
COMMUNITY
Start: 2024-11-19

## 2024-12-23 RX ORDER — DULOXETIN HYDROCHLORIDE 30 MG/1
1 CAPSULE, DELAYED RELEASE ORAL DAILY
COMMUNITY
Start: 2024-10-25

## 2024-12-23 RX ORDER — FUROSEMIDE 20 MG/1
20 TABLET ORAL DAILY
COMMUNITY
Start: 2024-12-20

## 2024-12-23 RX ORDER — SEMAGLUTIDE 0.68 MG/ML
0.5 INJECTION, SOLUTION SUBCUTANEOUS WEEKLY
COMMUNITY
Start: 2024-11-25

## 2024-12-23 RX ORDER — LEVOFLOXACIN 500 MG/1
1 TABLET, FILM COATED ORAL DAILY
COMMUNITY
Start: 2024-12-20

## 2024-12-23 RX ORDER — FLUTICASONE PROPIONATE 50 MCG
2 SPRAY, SUSPENSION (ML) NASAL DAILY
COMMUNITY
Start: 2024-09-16

## 2024-12-23 NOTE — PROGRESS NOTES
Chief Complaint  Chief Complaint   Patient presents with    adrenal cyst     Referring Provider:  Oren Beaver,*    HPI  Ms. Galindo is a 28 y.o. female with below past medical history who presents with reported left adrenal lesion.  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  She has chronic back/flank pain.  She reports associated radicular symptoms with pain shooting down her left side.    Past Medical History  Past Medical History:   Diagnosis Date    Anxiety     Asthma     Hypertension     Kidney stone     Sleep apnea        Past Surgical History  Past Surgical History:   Procedure Laterality Date    ADENOIDECTOMY      BARTHOLIN GLAND CYST EXCISION      KIDNEY STONE SURGERY      TONSILLECTOMY         Medications    Current Outpatient Medications:     azithromycin (ZITHROMAX) 250 MG tablet, Take 2 tablets the first day, then 1 tablet daily for 4 days., Disp: 6 tablet, Rfl: 0    DULoxetine (CYMBALTA) 30 MG capsule, Take 1 capsule by mouth Daily., Disp: , Rfl:     fluticasone (FLONASE) 50 MCG/ACT nasal spray, 2 sprays by Each Nare route Daily. Shake liquid, Disp: , Rfl:     furosemide (LASIX) 20 MG tablet, Take 1 tablet by mouth Daily., Disp: , Rfl:     levoFLOXacin (LEVAQUIN) 500 MG tablet, Take 1 tablet by mouth Daily., Disp: , Rfl:     losartan (COZAAR) 25 MG tablet, Take 1 tablet by mouth Daily., Disp: , Rfl:     meloxicam (MOBIC) 15 MG tablet, Take 1 tablet by mouth Daily., Disp: , Rfl:     METOPROLOL TARTRATE PO, Take  by mouth Daily., Disp: , Rfl:     Ozempic, 0.25 or 0.5 MG/DOSE, 2 MG/3ML solution pen-injector, Inject 0.5 mg under the skin into the appropriate area as directed 1 (One) Time Per Week., Disp: , Rfl:     potassium chloride (MICRO-K) 10 MEQ CR capsule, Take 1 capsule by mouth Daily.,  "Disp: , Rfl:     Allergies  Allergies   Allergen Reactions    Bee Venom Anaphylaxis    Mold Extract [Trichophyton] Anaphylaxis    Grass Pollen(K-O-R-T-Swt Nirav) Unknown (See Comments)    Molds & Smuts Unknown (See Comments)    Triticum Aestivum Other (See Comments) and Unknown (See Comments)     PATIENT DOESN'T KNOW       Social History  Social History     Socioeconomic History    Marital status:    Tobacco Use    Smoking status: Never    Smokeless tobacco: Never   Vaping Use    Vaping status: Never Used   Substance and Sexual Activity    Drug use: No    Sexual activity: Defer       Family History  History reviewed. No pertinent family history.       Physical Exam  Visit Vitals  /80   Pulse 81   Temp 98 °F (36.7 °C)   Ht 160 cm (63\")   Wt 134 kg (296 lb)   SpO2 98%   BMI 52.43 kg/m²     Physical exam was notable for BMI greater than 50.    Labs  Brief Urine Lab Results       None               Lab Results   Component Value Date    GLUCOSE 79 08/31/2018    CALCIUM 9.4 06/27/2022     06/27/2022    K 3.8 06/27/2022    CO2 26 06/27/2022     06/27/2022    BUN 14 06/27/2022    CREATININE 0.79 06/27/2022    EGFRIFAFRI >60 06/27/2022    EGFRIFNONA >60 06/27/2022    BCR 18 06/27/2022    ANIONGAP 12 06/27/2022       Lab Results   Component Value Date    WBC 7.44 10/18/2023    HGB 14.7 10/18/2023    HCT 41.3 10/18/2023    MCV 89 10/18/2023     10/18/2023         Radiographic Studies  Exam/Procedure: MR ABDOMEN W AND WO IV CONTRAST ordered by TIA MANUEL, [id number]    CLINICAL INDICATION:  Adrenal mass, > 4cm    TECHNIQUE:  MR imaging of the abdomen was performed with and without intravenous contrast material using the following sequences: coronal single shot T2 weighted fast spin echo, axial T2 weighted sequences with and without fat saturation, axial dual phase gradient echo, , and axial diffusion. 13.5 mL of Gadavist was administered.    COMPARISON:  CT adrenal protocol from an outside " [facility] from 12/28/2021.    FINDINGS:    Liver: No morphologic features of cirrhosis. Mild hepatic steatosis. No suspicious liver lesion visualized.    Gallbladder and biliary tree: Layering cholelithiasis within the proximal gallbladder body/neck. No intrahepatic or extrahepatic biliary ductal dilatation.    Pancreas: Unremarkable.    Spleen:    Adrenal Glands/Retroperitoneum: The left adrenal gland appears separate from the retroperitoneal cystic mass seen within the left retroperitoneum, best seen on coronal series 3, image 20 and coronal postcontrast series 20, image 51. There is a T2 hyperintense cluster of cysts versus septated cyst in the left retroperitoneum measuring 5.5 x 3.1 x 4.8 cm (series 4, image 12 and series 15, image 47). This lesion demonstrates mostly T1 hypointensity with mild T1 hyperintensity/T2 hypointensity along the medial periphery of this lesion best seen on series 14, image 30.    After contrast administration, there is no significant contrast enhancement within this lesion to suggest enhancing tumoral tissue (multiple subtraction series were reconstructed). There is no loss of signal within this lesion to suggest an adrenal adenoma. No macroscopic or microscopic fat visualized within the lesion. This lesion abuts the left renal vein along its inferior aspect. There are similar appearing T2 hyperintense and nonenhancing cystic foci within the left retrocrural soft tissues in the upper abdomen and extending along the mediastinum into the posterior mediastinum, best seen on diffusion-weighted images, series 12, images 38-55 and axial T2 blade fat sat series 11, images 1-18. The mediastinal component of these lesions extends beyond the field-of-view this MRI and measures up to 14 cm (series 3, image 25).    Kidneys: Bilateral kidneys are unremarkable. The left retroperitoneal cystic focus abuts the superior pole of the left kidney without invasion or involvement.    Vasculature: Patent  abdominal vasculature. No encasement of the mesenteric vasculature by the left retroperitoneal lesion.    Lymph Nodes: No enlarged lymph nodes in the upper abdomen.     I have personally reviewed the patient's labs and imaging report.  She did not bring a disc.        Assessment  Ms. Galindo is a 28 y.o. female with 5 cm left adrenal versus retroperitoneal lesion, possibly ganglioneuroma versus collection of cysts.  She has been seen by general surgery and oncology at , had biopsy with IR in 2022, showing ganglioneuroma with K-ricki mutation.     Patient is concerned that tumor may be responsible for her radicular symptoms.  I cannot tell because patient did not bring the disc with the images.  Regardless, I told her that I do not perform adrenal surgery and therefore we will refer her to someone who does.    Plan  1.  Referral to Dr. JR Rios at .  It is unclear to me why she was never referred to him or Dr. Ramirez, despite being seen by multiple other doctors at ,   2.  BMP, a.m. cortisol, plasma metanephrines and CT imaging with and without contrast adrenal mass protocol for posterity      Raoul Engel MD

## 2025-01-03 ENCOUNTER — TELEPHONE (OUTPATIENT)
Dept: UROLOGY | Facility: CLINIC | Age: 30
End: 2025-01-03
Payer: COMMERCIAL

## 2025-01-03 NOTE — TELEPHONE ENCOUNTER
Provider: DR BAUMAN    Caller: MOIRA [facility] [phone]    Relationship to Patient: PROVIDER    Reason for Call: MOIRA WITH [facility] CALLED ABOUT THE CT ON THIS PATIENT. LOOKS LIKE SHE DID NOT HAVE IT DONE DUE TO ILLNESS. THE ORDER EXPIRES ON 12-28-24. CAN A NEW ORDER BE PUT INTO THE SYSTEM. [facility] NEEDS TO SEE THIS TEST RESULT BEFORE SHE CAN BE SEEN.     DR BAUMAN REFERRED PATIENT TO DR MARTÍNEZ AT [facility]

## 2025-01-08 ENCOUNTER — TELEPHONE (OUTPATIENT)
Dept: UROLOGY | Facility: CLINIC | Age: 30
End: 2025-01-08
Payer: COMMERCIAL

## 2025-01-08 NOTE — TELEPHONE ENCOUNTER
PRANAVM advising that she still has labs and a CT scan she needs to have done before her appointment on 1/15/25 and depending on when she is able to get CT scan scheduled, we may need to reschedule her appointment.

## 2025-01-21 ENCOUNTER — TELEPHONE (OUTPATIENT)
Dept: UROLOGY | Facility: CLINIC | Age: 30
End: 2025-01-21
Payer: COMMERCIAL

## 2025-01-21 NOTE — TELEPHONE ENCOUNTER
PATIENT RETURNED CALL, UNABLE TO WARM TRANSFER CALL. SHE WILL TAKE THE 1:20 TIME THAT WAS OFFERED TO HER.PLEASE UPDATE THE SCHEDULE

## 2025-01-21 NOTE — TELEPHONE ENCOUNTER
I called and left  for patient to move up her appt time due to it was scheduled in the wrong time slot.    Amando, CMA

## 2025-01-27 ENCOUNTER — OFFICE VISIT (OUTPATIENT)
Dept: UROLOGY | Facility: CLINIC | Age: 30
End: 2025-01-27
Payer: COMMERCIAL

## 2025-01-27 VITALS
BODY MASS INDEX: 51.91 KG/M2 | HEART RATE: 75 BPM | DIASTOLIC BLOOD PRESSURE: 80 MMHG | TEMPERATURE: 97 F | HEIGHT: 63 IN | WEIGHT: 293 LBS | SYSTOLIC BLOOD PRESSURE: 122 MMHG | OXYGEN SATURATION: 97 %

## 2025-01-27 DIAGNOSIS — E27.9 LESION OF ADRENAL GLAND: Primary | ICD-10-CM

## 2025-01-27 RX ORDER — ONDANSETRON 4 MG/1
4 TABLET, ORALLY DISINTEGRATING ORAL EVERY 8 HOURS PRN
COMMUNITY
Start: 2024-12-28

## 2025-01-27 RX ORDER — POTASSIUM CHLORIDE 750 MG/1
10 TABLET, EXTENDED RELEASE ORAL DAILY
COMMUNITY
Start: 2024-08-30

## 2025-01-27 RX ORDER — GABAPENTIN 400 MG/1
1 CAPSULE ORAL 3 TIMES DAILY
COMMUNITY
Start: 2025-01-23

## 2025-01-27 NOTE — PROGRESS NOTES
Chief Complaint  Chief Complaint   Patient presents with    follow up with CT       HPI  Ms. Galindo is a 29 y.o. female with below past medical history who presents with reported left adrenal lesion.  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  She has chronic back/flank pain.  She reports associated radicular symptoms with pain shooting down her left side.    Past Medical History  Past Medical History:   Diagnosis Date    Anxiety     Asthma     Hypertension     Kidney stone     Sleep apnea        Past Surgical History  Past Surgical History:   Procedure Laterality Date    ADENOIDECTOMY      BARTHOLIN GLAND CYST EXCISION      KIDNEY STONE SURGERY      TONSILLECTOMY         Medications    Current Outpatient Medications:     gabapentin (NEURONTIN) 400 MG capsule, Take 1 capsule by mouth 3 times a day., Disp: , Rfl:     ondansetron ODT (ZOFRAN-ODT) 4 MG disintegrating tablet, Place 1 tablet on the tongue Every 8 (Eight) Hours As Needed., Disp: , Rfl:     potassium chloride (KLOR-CON M10) 10 MEQ CR tablet, Take 1 tablet by mouth Daily., Disp: , Rfl:     azithromycin (ZITHROMAX) 250 MG tablet, Take 2 tablets the first day, then 1 tablet daily for 4 days., Disp: 6 tablet, Rfl: 0    DULoxetine (CYMBALTA) 30 MG capsule, Take 1 capsule by mouth Daily., Disp: , Rfl:     fluticasone (FLONASE) 50 MCG/ACT nasal spray, 2 sprays by Each Nare route Daily. Shake liquid, Disp: , Rfl:     furosemide (LASIX) 20 MG tablet, Take 1 tablet by mouth Daily., Disp: , Rfl:     levoFLOXacin (LEVAQUIN) 500 MG tablet, Take 1 tablet by mouth Daily., Disp: , Rfl:     losartan (COZAAR) 25 MG tablet, Take 1 tablet by mouth Daily., Disp: , Rfl:     meloxicam (MOBIC) 15 MG tablet, Take 1 tablet by mouth Daily., Disp: , Rfl:     METOPROLOL TARTRATE PO,  "Take  by mouth Daily. (Patient not taking: Reported on 1/27/2025), Disp: , Rfl:     Ozempic, 0.25 or 0.5 MG/DOSE, 2 MG/3ML solution pen-injector, Inject 0.5 mg under the skin into the appropriate area as directed 1 (One) Time Per Week., Disp: , Rfl:     potassium chloride (MICRO-K) 10 MEQ CR capsule, Take 1 capsule by mouth Daily. (Patient not taking: Reported on 1/27/2025), Disp: , Rfl:     Allergies  Allergies   Allergen Reactions    Bee Venom Anaphylaxis    Mold Extract [Trichophyton] Anaphylaxis    Grass Pollen(K-O-R-T-Swt Nirav) Unknown (See Comments)    Molds & Smuts Unknown (See Comments)    Triticum Aestivum Other (See Comments) and Unknown (See Comments)     PATIENT DOESN'T KNOW       Social History  Social History     Socioeconomic History    Marital status:    Tobacco Use    Smoking status: Never     Passive exposure: Never    Smokeless tobacco: Never   Vaping Use    Vaping status: Never Used   Substance and Sexual Activity    Drug use: No    Sexual activity: Defer       Family History  History reviewed. No pertinent family history.       Physical Exam  Visit Vitals  /80   Pulse 75   Temp 97 °F (36.1 °C)   Ht 160 cm (63\")   Wt 133 kg (294 lb)   SpO2 97%   BMI 52.08 kg/m²     Physical exam was notable for BMI greater than 50.    Labs  Brief Urine Lab Results       None               Lab Results   Component Value Date    GLUCOSE 79 08/31/2018    CALCIUM 9.4 06/27/2022     06/27/2022    K 3.8 06/27/2022    CO2 26 06/27/2022     06/27/2022    BUN 14 06/27/2022    CREATININE 0.79 06/27/2022    EGFRIFAFRI >60 06/27/2022    EGFRIFNONA >60 06/27/2022    BCR 18 06/27/2022    ANIONGAP 12 06/27/2022       Lab Results   Component Value Date    WBC 7.44 10/18/2023    HGB 14.7 10/18/2023    HCT 41.3 10/18/2023    MCV 89 10/18/2023     10/18/2023         Radiographic Studies  Exam/Procedure: MR ABDOMEN W AND WO IV CONTRAST ordered by TIA MANUEL, [id number]  CLINICAL INDICATION:  Adrenal " mass, > 4cm  TECHNIQUE:  MR imaging of the abdomen was performed with and without intravenous contrast material using the following sequences: coronal single shot T2 weighted fast spin echo, axial T2 weighted sequences with and without fat saturation, axial dual phase gradient echo, , and axial diffusion. 13.5 mL of Gadavist was administered.  COMPARISON:  CT adrenal protocol from an outside hospital from 12/28/2021.  FINDINGS:  Liver: No morphologic features of cirrhosis. Mild hepatic steatosis. No suspicious liver lesion visualized.  Gallbladder and biliary tree: Layering cholelithiasis within the proximal gallbladder body/neck. No intrahepatic or extrahepatic biliary ductal dilatation.  Pancreas: Unremarkable.  Spleen:  Adrenal Glands/Retroperitoneum: The left adrenal gland appears separate from the retroperitoneal cystic mass seen within the left retroperitoneum, best seen on coronal series 3, image 20 and coronal postcontrast series 20, image 51. There is a T2 hyperintense cluster of cysts versus septated cyst in the left retroperitoneum measuring 5.5 x 3.1 x 4.8 cm (series 4, image 12 and series 15, image 47). This lesion demonstrates mostly T1 hypointensity with mild T1 hyperintensity/T2 hypointensity along the medial periphery of this lesion best seen on series 14, image 30.    After contrast administration, there is no significant contrast enhancement within this lesion to suggest enhancing tumoral tissue (multiple subtraction series were reconstructed). There is no loss of signal within this lesion to suggest an adrenal adenoma. No macroscopic or microscopic fat visualized within the lesion. This lesion abuts the left renal vein along its inferior aspect. There are similar appearing T2 hyperintense and nonenhancing cystic foci within the left retrocrural soft tissues in the upper abdomen and extending along the mediastinum into the posterior mediastinum, best seen on diffusion-weighted images, series 12,  images 38-55 and axial T2 blade fat sat series 11, images 1-18. The mediastinal component of these lesions extends beyond the field-of-view this MRI and measures up to 14 cm (series 3, image 25).    Kidneys: Bilateral kidneys are unremarkable. The left retroperitoneal cystic focus abuts the superior pole of the left kidney without invasion or involvement.  Vasculature: Patent abdominal vasculature. No encasement of the mesenteric vasculature by the left retroperitoneal lesion.  Lymph Nodes: No enlarged lymph nodes in the upper abdomen.   I have personally reviewed the patient's labs and imaging report.                She brought a disc with her CT scan, which I reviewed.  The mass is certainly greater than 5 cm, and the retroperitoneum in the area where the adrenal gland would be.  It is just behind the pancreas and very close to the splenic vein.  It would be very difficult to remove.       Assessment  Ms. Galindo is a 29 y.o. 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 She has been seen by general surgery and oncology at , had biopsy with IR in 2022, showing ganglioneuroma with K-ricki mutation.     Patient is concerned that tumor may be responsible for her radicular symptoms.  I reviewed with her that the large retroperitoneal mass is near the T12-L1 distribution, which would not cause pain shooting down the back of her leg.  That would be associated with pathology more in the L4 L5-S1 region.    Plan  1.  FU with Dr Rios as recommended.  If she wants removal of the suprarenal retroperitoneal mass, He would be the mo equipped to perform that surgery robotically.  I discussed with her that there is a good chance she might need a splenectomy if she pursued that surgery.  I also counseled her that removal of that mass could very well not impact her pain.    2.  I recommended that she have an MRI of the lumbar spine ordered with her PCP, as I would be concerned about foraminal stenosis, and  additional ganglioneuroma near her L4-L5 region, or degenerative disc disease or pathology associated with her weight.  BMI 52.    I spent a total of 40 minutes with the patient and the chart engaging in data gathering and interpretation, patient interaction, as well as counseling on the risks, benefits, and alternatives of the therapy and coordinating care.  I personally reached out to Dr. Rios and have discussed the case with him.  His  has tried to call the patient multiple times.        Raoul Engel MD